# Patient Record
Sex: FEMALE
[De-identification: names, ages, dates, MRNs, and addresses within clinical notes are randomized per-mention and may not be internally consistent; named-entity substitution may affect disease eponyms.]

---

## 2020-03-18 PROBLEM — Z00.00 ENCOUNTER FOR PREVENTIVE HEALTH EXAMINATION: Status: ACTIVE | Noted: 2020-03-18

## 2020-07-13 ENCOUNTER — APPOINTMENT (OUTPATIENT)
Dept: UROLOGY | Facility: CLINIC | Age: 60
End: 2020-07-13

## 2020-07-21 ENCOUNTER — APPOINTMENT (OUTPATIENT)
Dept: PAIN MANAGEMENT | Facility: CLINIC | Age: 60
End: 2020-07-21
Payer: SELF-PAY

## 2020-08-28 ENCOUNTER — APPOINTMENT (OUTPATIENT)
Dept: PAIN MANAGEMENT | Facility: CLINIC | Age: 60
End: 2020-08-28
Payer: SELF-PAY

## 2020-08-28 VITALS
HEIGHT: 65 IN | BODY MASS INDEX: 22.33 KG/M2 | DIASTOLIC BLOOD PRESSURE: 87 MMHG | HEART RATE: 109 BPM | SYSTOLIC BLOOD PRESSURE: 141 MMHG | WEIGHT: 134 LBS

## 2020-08-28 VITALS — TEMPERATURE: 97.1 F

## 2020-08-28 DIAGNOSIS — Z87.11 PERSONAL HISTORY OF PEPTIC ULCER DISEASE: ICD-10-CM

## 2020-08-28 DIAGNOSIS — G89.29 PELVIC AND PERINEAL PAIN: ICD-10-CM

## 2020-08-28 DIAGNOSIS — R10.2 PELVIC AND PERINEAL PAIN: ICD-10-CM

## 2020-08-28 PROCEDURE — 99243 OFF/OP CNSLTJ NEW/EST LOW 30: CPT

## 2020-08-28 RX ORDER — TIZANIDINE HYDROCHLORIDE 2 MG/1
2 CAPSULE ORAL DAILY
Qty: 60 | Refills: 2 | Status: ACTIVE | COMMUNITY
Start: 2020-08-28 | End: 1900-01-01

## 2020-09-12 NOTE — PHYSICAL EXAM
[General Appearance - Alert] : alert [Affect] : the affect was normal [Person] : oriented to person [Time] : oriented to time [Place] : oriented to place [Motor Strength] : muscle strength was normal in all four extremities [Cranial Nerves Oculomotor (III)] : extraocular motion intact [Sclera] : the sclera and conjunctiva were normal [Outer Ear] : the ears and nose were normal in appearance [Neck Appearance] : the appearance of the neck was normal [] : no rash [FreeTextEntry1] : bruising on right knee

## 2020-09-12 NOTE — HISTORY OF PRESENT ILLNESS
[FreeTextEntry1] : 59 y/o female ho IC since 28 years old...no specific trigger. She began having repeated UTIs requiring 2 years of hernández and dx with IC. \par \par The pain is described as extended air in the bladder alternating with a "fist" sensation; In addition, co pelvic spasms and 'as if I am urinating electricity'. Ho scoliosis but no ho back pain.\par \par She feels the pain " holds me captive".\par \par Currently, Vicodin 5 mgs 1-2 tid; gabapentin 1200 mg tid.Never tried zanaflex, or cymbalta.\par She was on Methadone and Klonopin. Elavil made her feel like a zombie.  \par \par covid+ february\par

## 2020-09-12 NOTE — ASSESSMENT
[FreeTextEntry1] : Pelvic floor pain\par IC\par \par Discussed various drug treatments.\par Opted for tizanidine 2 mgs. Advised of AEs. \par UDT

## 2021-02-12 RX ORDER — CLONAZEPAM 0.5 MG/1
0.5 TABLET ORAL TWICE DAILY
Qty: 28 | Refills: 0 | Status: ACTIVE | COMMUNITY
Start: 2021-02-12 | End: 1900-01-01